# Patient Record
Sex: FEMALE | Race: WHITE | NOT HISPANIC OR LATINO | Employment: UNEMPLOYED | URBAN - METROPOLITAN AREA
[De-identification: names, ages, dates, MRNs, and addresses within clinical notes are randomized per-mention and may not be internally consistent; named-entity substitution may affect disease eponyms.]

---
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
## 2025-04-22 ENCOUNTER — TELEPHONE (OUTPATIENT)
Age: 20
End: 2025-04-22

## 2025-04-22 ENCOUNTER — TELEPHONE (OUTPATIENT)
Dept: PEDIATRICS CLINIC | Facility: CLINIC | Age: 20
End: 2025-04-22

## 2025-04-22 NOTE — TELEPHONE ENCOUNTER
Received an outside record for the patient, has not been seen since she was 16. Cib listed as PCP, AA listed as RTE.

## 2025-04-22 NOTE — TELEPHONE ENCOUNTER
Left message to change PCP and inquire if patient transferred to adult PCP and to please call the office with an update.

## 2025-04-24 NOTE — TELEPHONE ENCOUNTER
Patient has aged out of the practice & will be transferring to adult PCP.    Our provider Danny Ortiz is listed as RTE for two previous insurances, [employer] and [employer] which are no longer valid. Patient has [employer].    Please remove Carline Pina as PCP.    Thank you.

## 2025-04-27 NOTE — TELEPHONE ENCOUNTER
04/27/25 6:56 AM        The office's request has been received, reviewed, and the patient chart updated. The PCP has successfully been removed with a patient attribution note. This message will now be completed.        Thank you  Gina Ponce
